# Patient Record
Sex: FEMALE | Race: WHITE | NOT HISPANIC OR LATINO | Employment: UNEMPLOYED | URBAN - METROPOLITAN AREA
[De-identification: names, ages, dates, MRNs, and addresses within clinical notes are randomized per-mention and may not be internally consistent; named-entity substitution may affect disease eponyms.]

---

## 2023-04-12 ENCOUNTER — HOSPITAL ENCOUNTER (EMERGENCY)
Facility: CLINIC | Age: 43
Discharge: HOME | End: 2023-04-13
Attending: FAMILY MEDICINE | Admitting: FAMILY MEDICINE
Payer: COMMERCIAL

## 2023-04-12 DIAGNOSIS — S05.01XA ABRASION OF RIGHT CORNEA, INITIAL ENCOUNTER: ICD-10-CM

## 2023-04-12 PROCEDURE — 99283 EMERGENCY DEPT VISIT LOW MDM: CPT | Performed by: FAMILY MEDICINE

## 2023-04-12 ASSESSMENT — ACTIVITIES OF DAILY LIVING (ADL): ADLS_ACUITY_SCORE: 33

## 2023-04-13 VITALS
OXYGEN SATURATION: 98 % | WEIGHT: 230 LBS | DIASTOLIC BLOOD PRESSURE: 82 MMHG | TEMPERATURE: 98.1 F | SYSTOLIC BLOOD PRESSURE: 129 MMHG | HEART RATE: 78 BPM | RESPIRATION RATE: 16 BRPM

## 2023-04-13 PROCEDURE — 250N000009 HC RX 250: Performed by: FAMILY MEDICINE

## 2023-04-13 PROCEDURE — 250N000013 HC RX MED GY IP 250 OP 250 PS 637: Performed by: FAMILY MEDICINE

## 2023-04-13 RX ORDER — TETRACAINE HYDROCHLORIDE 5 MG/ML
2 SOLUTION OPHTHALMIC ONCE
Status: COMPLETED | OUTPATIENT
Start: 2023-04-13 | End: 2023-04-13

## 2023-04-13 RX ADMIN — TETRACAINE HYDROCHLORIDE 2 DROP: 5 SOLUTION OPHTHALMIC at 00:29

## 2023-04-13 RX ADMIN — IBUPROFEN 600 MG: 400 TABLET, FILM COATED ORAL at 00:29

## 2023-04-13 NOTE — ED PROVIDER NOTES
HPI   Patient is a 43-year-old female presenting with an injury to her right eye.  This occurred just prior to arrival.  Her daughter accidentally jabbed her fingers into the right eye.  There was immediate pain.  She describes blurriness of the right side.  No bleeding.  No double vision.  No field cuts.  No other injuries reported.  Pain is severe.        Allergies:  Not on File  Problem List:    There are no problems to display for this patient.     Past Medical History:    No past medical history on file.  Past Surgical History:    No past surgical history on file.  Family History:    No family history on file.  Social History:  Marital Status:   [2]      Medications:    No current outpatient medications on file.    Review of Systems   All other systems reviewed and are negative.      PE   BP: (!) 136/90  Pulse: 71  Temp: 98.1  F (36.7  C)  Resp: 14  Weight: 104.3 kg (230 lb)  SpO2: 99 %  Physical Exam  Vitals reviewed.   Constitutional:       General: She is not in acute distress.     Appearance: She is well-developed.   HENT:      Head: Normocephalic and atraumatic.      Right Ear: External ear normal.      Left Ear: External ear normal.      Nose: Nose normal.      Mouth/Throat:      Mouth: Mucous membranes are moist.      Pharynx: Oropharynx is clear.   Eyes:      Extraocular Movements: Extraocular movements intact.      Pupils: Pupils are equal, round, and reactive to light.      Comments: Injected conjunctiva on the right.  Anterior chamber unremarkable.  Fluorescein dye will be placed and results described below.   Cardiovascular:      Rate and Rhythm: Normal rate and regular rhythm.   Pulmonary:      Effort: Pulmonary effort is normal.   Musculoskeletal:         General: Normal range of motion.      Cervical back: Normal range of motion.   Skin:     General: Skin is warm and dry.   Neurological:      Mental Status: She is alert and oriented to person, place, and time.   Psychiatric:          Behavior: Behavior normal.         ED COURSE and [facility]   0022.  Patient has symptoms and signs as described above.  Patient with an eye injury.  I suspect this is a corneal abrasion.  Topical anesthetic will be placed, ibuprofen ordered.  Then, I will check for abrasion with fluorescein.  She is currently on penicillin for strep    0040.  Tetracaine helped with pain.  Excellent exam performed.  No foreign body on the surface of the eye.  No flap wound or laceration on the eye.  The upper lid was flipped and there was no foreign body present.  Fluorescein dye placed and there was an obvious abrasion covering almost the entire medial half of the cornea, just in front of the iris.  Tetracaine for home, as directed only.  Alternate ibuprofen and Tylenol.  Follow-up discussed.  Return for worsening.    Electronic medical chart reviewed, including medical problems, medications, medical allergies, social history.  Recent hospitalizations and surgical procedures reviewed.  Recent clinic visits and consultations reviewed.  Recent labs and test results reviewed.  Nursing notes reviewed.    0041.  The patient, their parent if applicable, and/or their medical decision maker(s) and I have reviewed all of the available historical information, applicable PMH, physical exam findings, and objective diagnostic data gathered during this ED visit.  We then discussed all work-up options and then together agreed upon the course taken during this visit.  The ultimate disposition and plan was a cooperative decision made between myself and the patient, their parent if applicable, and/or their legal decision maker(s).  The risks and benefits of all decisions made during this visit were discussed to the best of my abilities given the circumstances, and all parties are understanding of the pertinent ramifications of these decisions.      LABS  Labs Ordered and Resulted from Time of ED Arrival to Time of ED Departure - No data to  display    IMAGING  Images reviewed by me.  Radiology report also reviewed.  No orders to display       Procedures    Medications   tetracaine (PONTOCAINE) 0.5 % ophthalmic solution 2 drop (2 drops Right Eye $Given 4/13/23 0029)   ibuprofen (ADVIL/MOTRIN) tablet 600 mg (600 mg Oral $Given 4/13/23 0029)         IMPRESSION       ICD-10-CM    1. Abrasion of right cornea, initial encounter  S05.01XA                Medication List      There are no discharge medications for this visit.                     Gregory Lyons MD  04/13/23 0041

## 2023-04-13 NOTE — DISCHARGE INSTRUCTIONS
RETURN TO THE EMERGENCY ROOM FOR THE FOLLOWING:    Severely worsened pain, severely worsened vision, or at anytime for any concern.    FOLLOW UP:    Consider follow-up with ophthalmology if not improved over the next 3 days.  See contact information provided for scheduling.    TREATMENT RECOMMENDATIONS:    Ibuprofen 600 mg every six hours for pain (7 days duration).  Tylenol 1000 mg every six hours for pain (7 days duration).  Therefore, you can alternate these every three hours and do it safely.    Tetracaine 2 drops every two hours as needed for pain, not to be used beyond 24 hours.    NURSE ADVICE LINE:  [phone] or [phone]

## 2023-09-24 ENCOUNTER — HEALTH MAINTENANCE LETTER (OUTPATIENT)
Age: 43
End: 2023-09-24

## 2023-10-11 ENCOUNTER — HOSPITAL ENCOUNTER (EMERGENCY)
Facility: CLINIC | Age: 43
Discharge: HOME | End: 2023-10-11
Attending: FAMILY MEDICINE | Admitting: FAMILY MEDICINE
Payer: COMMERCIAL

## 2023-10-11 VITALS
BODY MASS INDEX: 42.33 KG/M2 | HEIGHT: 62 IN | SYSTOLIC BLOOD PRESSURE: 127 MMHG | HEART RATE: 69 BPM | OXYGEN SATURATION: 100 % | WEIGHT: 230 LBS | TEMPERATURE: 97.8 F | DIASTOLIC BLOOD PRESSURE: 71 MMHG | RESPIRATION RATE: 20 BRPM

## 2023-10-11 DIAGNOSIS — H93.13 TINNITUS, BILATERAL: ICD-10-CM

## 2023-10-11 DIAGNOSIS — H81.23 VESTIBULAR NEURONITIS OF BOTH EARS: ICD-10-CM

## 2023-10-11 DIAGNOSIS — R42 DIZZINESS: ICD-10-CM

## 2023-10-11 DIAGNOSIS — J06.9 UPPER RESPIRATORY TRACT INFECTION, UNSPECIFIED TYPE: ICD-10-CM

## 2023-10-11 DIAGNOSIS — J45.30 MILD PERSISTENT ASTHMA, UNSPECIFIED WHETHER COMPLICATED: ICD-10-CM

## 2023-10-11 DIAGNOSIS — H69.93 EUSTACHIAN TUBE DYSFUNCTION, BILATERAL: ICD-10-CM

## 2023-10-11 DIAGNOSIS — G44.209 TENSION HEADACHE: ICD-10-CM

## 2023-10-11 LAB
ANION GAP SERPL CALCULATED.3IONS-SCNC: 10 MMOL/L (ref 7–15)
BASO+EOS+MONOS # BLD AUTO: ABNORMAL 10*3/UL
BASO+EOS+MONOS NFR BLD AUTO: ABNORMAL %
BASOPHILS # BLD AUTO: 0.1 10E3/UL (ref 0–0.2)
BASOPHILS NFR BLD AUTO: 1 %
BUN SERPL-MCNC: 16.8 MG/DL (ref 6–20)
CALCIUM SERPL-MCNC: 9.8 MG/DL (ref 8.6–10)
CHLORIDE SERPL-SCNC: 99 MMOL/L (ref 98–107)
CREAT SERPL-MCNC: 0.68 MG/DL (ref 0.51–0.95)
DEPRECATED HCO3 PLAS-SCNC: 27 MMOL/L (ref 22–29)
EGFRCR SERPLBLD CKD-EPI 2021: >90 ML/MIN/1.73M2
EOSINOPHIL # BLD AUTO: 0.1 10E3/UL (ref 0–0.7)
EOSINOPHIL NFR BLD AUTO: 1 %
ERYTHROCYTE [DISTWIDTH] IN BLOOD BY AUTOMATED COUNT: 12.6 % (ref 10–15)
GLUCOSE SERPL-MCNC: 92 MG/DL (ref 70–99)
HCT VFR BLD AUTO: 43.2 % (ref 35–47)
HGB BLD-MCNC: 15 G/DL (ref 11.7–15.7)
HOLD SPECIMEN: NORMAL
IMM GRANULOCYTES # BLD: 0.1 10E3/UL
IMM GRANULOCYTES NFR BLD: 1 %
LYMPHOCYTES # BLD AUTO: 3.7 10E3/UL (ref 0.8–5.3)
LYMPHOCYTES NFR BLD AUTO: 25 %
MCH RBC QN AUTO: 34 PG (ref 26.5–33)
MCHC RBC AUTO-ENTMCNC: 34.7 G/DL (ref 31.5–36.5)
MCV RBC AUTO: 98 FL (ref 78–100)
MONOCYTES # BLD AUTO: 0.7 10E3/UL (ref 0–1.3)
MONOCYTES NFR BLD AUTO: 5 %
NEUTROPHILS # BLD AUTO: 10.1 10E3/UL (ref 1.6–8.3)
NEUTROPHILS NFR BLD AUTO: 67 %
NRBC # BLD AUTO: 0 10E3/UL
NRBC BLD AUTO-RTO: 0 /100
PLATELET # BLD AUTO: 328 10E3/UL (ref 150–450)
POTASSIUM SERPL-SCNC: 3.7 MMOL/L (ref 3.4–5.3)
RBC # BLD AUTO: 4.41 10E6/UL (ref 3.8–5.2)
SODIUM SERPL-SCNC: 136 MMOL/L (ref 135–145)
WBC # BLD AUTO: 14.7 10E3/UL (ref 4–11)

## 2023-10-11 PROCEDURE — 96374 THER/PROPH/DIAG INJ IV PUSH: CPT | Performed by: FAMILY MEDICINE

## 2023-10-11 PROCEDURE — 96361 HYDRATE IV INFUSION ADD-ON: CPT | Performed by: FAMILY MEDICINE

## 2023-10-11 PROCEDURE — 85025 COMPLETE CBC W/AUTO DIFF WBC: CPT | Performed by: FAMILY MEDICINE

## 2023-10-11 PROCEDURE — 250N000011 HC RX IP 250 OP 636: Mod: JZ | Performed by: FAMILY MEDICINE

## 2023-10-11 PROCEDURE — 99284 EMERGENCY DEPT VISIT MOD MDM: CPT | Mod: 25 | Performed by: FAMILY MEDICINE

## 2023-10-11 PROCEDURE — 36415 COLL VENOUS BLD VENIPUNCTURE: CPT | Performed by: EMERGENCY MEDICINE

## 2023-10-11 PROCEDURE — 93005 ELECTROCARDIOGRAM TRACING: CPT | Performed by: FAMILY MEDICINE

## 2023-10-11 PROCEDURE — 93010 ELECTROCARDIOGRAM REPORT: CPT | Performed by: FAMILY MEDICINE

## 2023-10-11 PROCEDURE — 258N000003 HC RX IP 258 OP 636: Performed by: FAMILY MEDICINE

## 2023-10-11 PROCEDURE — 80048 BASIC METABOLIC PNL TOTAL CA: CPT | Performed by: FAMILY MEDICINE

## 2023-10-11 PROCEDURE — 85025 COMPLETE CBC W/AUTO DIFF WBC: CPT | Performed by: EMERGENCY MEDICINE

## 2023-10-11 RX ORDER — SODIUM CHLORIDE 9 MG/ML
1000 INJECTION, SOLUTION INTRAVENOUS CONTINUOUS
Status: DISCONTINUED | OUTPATIENT
Start: 2023-10-11 | End: 2023-10-11 | Stop reason: HOSPADM

## 2023-10-11 RX ORDER — KETOROLAC TROMETHAMINE 15 MG/ML
15 INJECTION, SOLUTION INTRAMUSCULAR; INTRAVENOUS ONCE
Status: COMPLETED | OUTPATIENT
Start: 2023-10-11 | End: 2023-10-11

## 2023-10-11 RX ADMIN — KETOROLAC TROMETHAMINE 15 MG: 15 INJECTION, SOLUTION INTRAMUSCULAR; INTRAVENOUS at 17:34

## 2023-10-11 RX ADMIN — SODIUM CHLORIDE 1000 ML: 9 INJECTION, SOLUTION INTRAVENOUS at 17:35

## 2023-10-11 ASSESSMENT — ENCOUNTER SYMPTOMS
DYSURIA: 0
NAUSEA: 0
VOMITING: 0
FEVER: 0
CONSTIPATION: 0
BLOOD IN STOOL: 0
SHORTNESS OF BREATH: 0
PALPITATIONS: 0
COUGH: 1
WHEEZING: 0
HEADACHES: 0
CHILLS: 0
DIAPHORESIS: 0
SORE THROAT: 0
SINUS PRESSURE: 1
CHEST TIGHTNESS: 1
ABDOMINAL PAIN: 0
DIARRHEA: 0
FREQUENCY: 0

## 2023-10-11 ASSESSMENT — ACTIVITIES OF DAILY LIVING (ADL): ADLS_ACUITY_SCORE: 35

## 2023-10-11 NOTE — DISCHARGE INSTRUCTIONS
ICD-10-CM    1. Upper respiratory tract infection, unspecified type  J06.9       2. Tinnitus, bilateral  H93.13       3. Eustachian tube dysfunction, bilateral  H69.93     consider nasal saline .  consider  starting nasal flonase and using for the next  few weeks. this is OTC. if sinus headache is significant, may use afrin nsasal twice daily for  no more than 3 days.      4. Vestibular neuronitis of both ears  H81.23     this typically lasts 7-10 days and then resolves.  avoid rapid head aturning and exerrcise caution when driving.  you may try OTC antivert or dramamine which can help the dizzy sensation.   return here immed for changes in thinking, one  sided weakness, incoordination, listing to one side, changes in speech or swallowing      5. Tension headache  G44.209     may use ibuprofen occl as needed 400 to 600 mg.  tylenol 1000 mg every 6 hours as needed.      6. Dizziness  R42     evaluation today was to exlude serious causes of dizziness which are vertigo (spinning), syncope (passing out or near  passing out) or ataxia (sea sick walking),  The evaluation for underlying stroke or heart/lung findings is negative.  I do suspect this is all post-viral and could have been covid  which can cause  lingering symptoms      7. Mild persistent asthma, unspecified whether complicated  J45.30     I suspect this is persistent  and would recommend  staying on flovent.  follow-up clinic and consider lung function tests.   return here for exertional chest pain in the right or left chest.  chest pain worse with deep breathing, fever

## 2023-10-11 NOTE — ED NOTES
"Pt reports multiple complaints. Bilateral tingling of the arms/hands pt states she was \"pacing on the phone with a triage nurse, and nervous\" when this happened. Pt has been on prednisone and inhaler due to an asthma flare up. Pt also complains of ear aches bilateral, dizziness. Pt has rapid speech and rambling on, appears anxious.   "

## 2023-10-11 NOTE — ED PROVIDER NOTES
History     Chief Complaint   Patient presents with    Numbness    Otalgia    Chest Pain     HPI  Fernanda Mathis is a 43 year old female who presents with upper respiratory symptoms starting about 9 days ago.  She was seen in clinic on 2 October for 4 days of upper respiratory symptoms.  She  Was started on prednisone for 5 days.  She was given Flovent.  She was also given albuterol inhaler.  Her strep and COVID were tested and were negative.  She improved in respiratory status after this but still has some residual chest tightness and occasional wheezing following the cold symptoms.  She has no radiation of the pain and has no history of VTE.  She has no known risk factors for VTE.  She arrives today referred from her clinic triage for having multiple upper respiratory symptoms and vertigo.  She has bilateral ear fullness and ringing in the ears since yesterday.  She has facial pain at times over the frontal forehead.  She has been experiencing a sense of dizziness although not vannessa vertigo.  She has a tension headache.  There is no associated weakness is change in speech or swallowing double vision or vision loss.  No significant incoordination.  She does feels off today.    Denies recent prolonged travel (>3 hours) by car or plane, history or FHx of venous thromboembolism, recent surgery (last 4 weeks), active cancer history, hypercoagulable state, estrogen or other medications/conditions causing VTE or  new unilateral swelling or pain in the legs or calves.            Allergies:  Not on File    Problem List:    There are no problems to display for this patient.       Past Medical History:    No past medical history on file.    Past Surgical History:    No past surgical history on file.    Family History:    No family history on file.    Social History:  Marital Status:   [2]        Medications:    No current outpatient medications on file.        Review of Systems   Constitutional:  Negative  "for chills, diaphoresis and fever.   HENT:  Positive for congestion, hearing loss, sinus pressure and tinnitus. Negative for ear pain and sore throat.    Eyes:  Negative for visual disturbance.   Respiratory:  Positive for cough and chest tightness. Negative for shortness of breath and wheezing.    Cardiovascular:  Negative for chest pain and palpitations.   Gastrointestinal:  Negative for abdominal pain, blood in stool, constipation, diarrhea, nausea and vomiting.   Genitourinary:  Negative for dysuria, frequency and urgency.   Skin:  Negative for rash.   Neurological:  Negative for headaches.   All other systems reviewed and are negative.      Physical Exam   BP: 138/85  Pulse: 86  Temp: 97.8  F (36.6  C)  Resp: 20  Height: 157.5 cm (5' 2\")  Weight: 104.3 kg (230 lb)  SpO2: 100 %      Physical Exam  Constitutional:       General: She is in acute distress.      Appearance: She is not diaphoretic.   HENT:      Head: Atraumatic.      Right Ear: Tympanic membrane normal.      Left Ear: Tympanic membrane normal.   Eyes:      Conjunctiva/sclera: Conjunctivae normal.   Cardiovascular:      Rate and Rhythm: Normal rate and regular rhythm.      Pulses: Normal pulses.      Heart sounds: No murmur heard.  Pulmonary:      Effort: Pulmonary effort is normal. No respiratory distress.      Breath sounds: Normal breath sounds. No stridor. No wheezing or rhonchi.   Abdominal:      General: There is no distension.      Palpations: There is no mass.      Tenderness: There is no abdominal tenderness. There is no guarding.   Musculoskeletal:      Cervical back: Neck supple.      Right lower leg: No edema.      Left lower leg: No edema.   Skin:     Coloration: Skin is not pale.      Findings: No rash.   Neurological:      General: No focal deficit present.      Mental Status: She is alert and oriented to person, place, and time.      GCS: GCS eye subscore is 4. GCS verbal subscore is 5. GCS motor subscore is 6.      Cranial Nerves: " Cranial nerves 2-12 are intact. No cranial nerve deficit, dysarthria or facial asymmetry.      Sensory: No sensory deficit.      Motor: No weakness, tremor, abnormal muscle tone or pronator drift.      Coordination: Romberg sign negative. Coordination normal. Finger-Nose-Finger Test normal.      Gait: Gait normal.     I see a few beats of nystagmus present on looking to either the lateral endpoints.  But I see no obvious significant nystagmus and there is no vertical nystagmus and no changes in test of skew.  Movements do not seem to exacerbate her symptoms and she does not appear to have vertigo at rest.  She does complain of headache posteriorly and in a tension like headache fashion.  Likely eustachian tube dysfunction of both years with no obvious effusion or erythema of either drum.  Her neurologic exam is fully intact and she is ambulated in the room without findings of ataxia or incoordination.      ED Course                 Procedures                EKG Interpretation:      Interpreted by Tomas Fortune MD  EKG done at 1720 hrs. demonstrates a sinus rhythm 66 bpm normal axis.  There is no ST change.  No T wave changes.  Normal R progression and no Q waves.  Normal intervals.  Normal conduction.  No ectopy.  Impression sinus rhythm 66 bpm no acute change.    Critical Care time:  none               Results for orders placed or performed during the hospital encounter of 10/11/23 (from the past 24 hour(s))   [place] Draw *Canceled*    Narrative    The following orders were created for panel order [place] Draw.  Procedure                               Abnormality         Status                     ---------                               -----------         ------                       Please view results for these tests on the individual orders.   CBC with platelets, differential    Narrative    The following orders were created for panel order CBC with platelets, differential.  Procedure                                Abnormality         Status                     ---------                               -----------         ------                     CBC with platelets and d...[[id number]]  Abnormal            Final result                 Please view results for these tests on the individual orders.   [place] Draw    Narrative    The following orders were created for panel order [place] Draw.  Procedure                               Abnormality         Status                     ---------                               -----------         ------                     Extra Green Top (Lithium...[[id number]]                      Final result               Extra Purple Top Tube[[id number]]                                                         Please view results for these tests on the individual orders.   CBC with platelets and differential   Result Value Ref Range    WBC Count 14.7 (H) 4.0 - 11.0 10e3/uL    RBC Count 4.41 3.80 - 5.20 10e6/uL    Hemoglobin 15.0 11.7 - 15.7 g/dL    Hematocrit 43.2 35.0 - 47.0 %    MCV 98 78 - 100 fL    MCH 34.0 (H) 26.5 - 33.0 pg    MCHC 34.7 31.5 - 36.5 g/dL    RDW 12.6 10.0 - 15.0 %    Platelet Count 328 150 - 450 10e3/uL    % Neutrophils 67 %    % Lymphocytes 25 %    % Monocytes 5 %    Mids % (Monos, Eos, Basos)      % Eosinophils 1 %    % Basophils 1 %    % Immature Granulocytes 1 %    NRBCs per 100 WBC 0 <1 /100    Absolute Neutrophils 10.1 (H) 1.6 - 8.3 10e3/uL    Absolute Lymphocytes 3.7 0.8 - 5.3 10e3/uL    Absolute Monocytes 0.7 0.0 - 1.3 10e3/uL    Mids Abs (Monos, Eos, Basos)      Absolute Eosinophils 0.1 0.0 - 0.7 10e3/uL    Absolute Basophils 0.1 0.0 - 0.2 10e3/uL    Absolute Immature Granulocytes 0.1 <=0.4 10e3/uL    Absolute NRBCs 0.0 10e3/uL   Extra Green Top (Lithium Heparin) Tube   Result Value Ref Range    Hold Specimen [facility]    Basic metabolic panel   Result Value Ref Range    Sodium 136 135 - 145 mmol/L    Potassium 3.7 3.4 - 5.3 mmol/L    Chloride 99 98 - 107 mmol/L    Carbon  Dioxide (CO2) 27 22 - 29 mmol/L    Anion Gap 10 7 - 15 mmol/L    Urea Nitrogen 16.8 6.0 - 20.0 mg/dL    Creatinine 0.68 0.51 - 0.95 mg/dL    GFR Estimate >90 >60 mL/min/1.73m2    Calcium 9.8 8.6 - 10.0 mg/dL    Glucose 92 70 - 99 mg/dL       Medications   ketorolac (TORADOL) injection 15 mg (has no administration in time range)   sodium chloride 0.9% BOLUS 1,000 mL (has no administration in time range)   sodium chloride 0.9 % infusion (has no administration in time range)       Assessments & Plan (with Medical Decision Making)     MDM: Presenting with several concerns post viral syndrome.  She has asthma that appears to be improving but not completely and she did not continue the Flovent that was prescribed for her and I encouraged her to do so.  Her lungs are clear and there are no findings today of more significant respiratory findings on exam.  She has no VTE risk.  Her EKG is without change.  Unclear if this is more of near syncope sensation versus vertigo versus lightheadedness.  No obvious signs of ataxia.  I do suspect she may be experiencing some postviral vertigo and may have vestibular neuronitis but her examination demonstrates only a few beats of nystagmus laterally and her hints exam is unreliable in the setting.  I did recommend completing the near syncope evaluation with labs and also treating her headache here which I have initiated.  We will discuss discharge home.      I have reviewed the nursing notes.    I have reviewed the findings, diagnosis, plan and need for follow up with the patient.           Medical Decision Making  The patient's presentation was of moderate complexity (an undiagnosed new problem with uncertain diagnosis).    The patient's evaluation involved:  ordering and/or review of 3+ test(s) in this encounter (see separate area of note for details)    The patient's management necessitated only low risk treatment.        New Prescriptions    No medications on file       Final  diagnoses:   Upper respiratory tract infection, unspecified type   Tinnitus, bilateral   Eustachian tube dysfunction, bilateral - consider nasal saline .  consider  starting nasal flonase and using for the next  few weeks. this is OTC. if sinus headache is significant, may use afrin nsasal twice daily for  no more than 3 days.   Vestibular neuronitis of both ears - this typically lasts 7-10 days and then resolves.  avoid rapid head aturning and exerrcise caution when driving.  you may try OTC antivert or dramamine which can help the dizzy sensation.   return here immed for changes in thinking, one  sided weakness, incoordination, listing to one side, changes in speech or swallowing   Tension headache - may use ibuprofen occl as needed 400 to 600 mg.  tylenol 1000 mg every 6 hours as needed.   Dizziness - evaluation today was to exlude serious causes of dizziness which are vertigo (spinning), syncope (passing out or near  passing out) or ataxia (sea sick walking),  The evaluation for underlying stroke or heart/lung findings is negative.  I do suspect this is all post-viral and could have been covid  which can cause  lingering symptoms   Mild persistent asthma, unspecified whether complicated - I suspect this is persistent  and would recommend  staying on flovent.  follow-up clinic and consider lung function tests.   return here for exertional chest pain in the right or left chest.  chest pain worse with deep breathing, fever       10/11/2023   [facility] EMERGENCY DEPT       Tomas Fortune MD  10/12/23 0046

## 2023-10-11 NOTE — ED TRIAGE NOTES
"  Pt states dizziness and L ear pain started yesterday and now both ears hurts.  Tylenol taken for comfort with little results.   Feels \"a little off\".  Pt with atight chest for 1 week and still has it:  was on steroid inhaler and prednisone.  Pt also reports numbness in both her arms  and tingling down both her legs.         "

## 2024-02-11 ENCOUNTER — HEALTH MAINTENANCE LETTER (OUTPATIENT)
Age: 44
End: 2024-02-11

## 2024-11-17 ENCOUNTER — HEALTH MAINTENANCE LETTER (OUTPATIENT)
Age: 44
End: 2024-11-17